# Patient Record
Sex: MALE | Race: OTHER | HISPANIC OR LATINO | URBAN - METROPOLITAN AREA
[De-identification: names, ages, dates, MRNs, and addresses within clinical notes are randomized per-mention and may not be internally consistent; named-entity substitution may affect disease eponyms.]

---

## 2020-06-16 ENCOUNTER — OFFICE VISIT (OUTPATIENT)
Dept: URBAN - METROPOLITAN AREA TELEHEALTH 2 | Facility: TELEHEALTH | Age: 45
End: 2020-06-16
Payer: SELF-PAY

## 2020-06-16 DIAGNOSIS — R42 DIZZINESS: ICD-10-CM

## 2020-06-16 DIAGNOSIS — R12 HEARTBURN: ICD-10-CM

## 2020-06-16 DIAGNOSIS — R10.32 LLQ PAIN: ICD-10-CM

## 2020-06-16 PROCEDURE — G9903 PT SCRN TBCO ID AS NON USER: HCPCS | Performed by: INTERNAL MEDICINE

## 2020-06-16 PROCEDURE — G8420 CALC BMI NORM PARAMETERS: HCPCS | Performed by: INTERNAL MEDICINE

## 2020-06-16 PROCEDURE — 99214 OFFICE O/P EST MOD 30 MIN: CPT | Performed by: INTERNAL MEDICINE

## 2020-06-16 PROCEDURE — G8427 DOCREV CUR MEDS BY ELIG CLIN: HCPCS | Performed by: INTERNAL MEDICINE

## 2020-06-16 PROCEDURE — 1036F TOBACCO NON-USER: CPT | Performed by: INTERNAL MEDICINE

## 2020-06-16 RX ORDER — HYOSCYAMINE SULFATE 0.12 MG/1
1 TABLET AS NEEDED TABLET ORAL
Qty: 40 | Refills: 2 | OUTPATIENT
Start: 2020-06-16

## 2020-06-16 RX ORDER — PANTOPRAZOLE SODIUM 40 MG/1
1 TABLET TABLET, DELAYED RELEASE ORAL
Qty: 30 | Refills: 2 | OUTPATIENT

## 2020-06-16 RX ORDER — DILTIAZEM HYDROCHLORIDE 120 MG/1
TAKE 1 CAPSULE (120 MG) BY ORAL ROUTE ONCE DAILY CAPSULE, EXTENDED RELEASE ORAL 1
Qty: 0 | Refills: 0 | Status: ACTIVE | COMMUNITY
Start: 1900-01-01

## 2020-06-16 RX ORDER — SUCRALFATE 1 G/1
TAKE 1 TABLET (1 GRAM) BY ORAL ROUTE 2 TIMES PER DAY ON AN EMPTY STOMACH 1 HOUR BEFORE LUNCH AND AT BEDTIME FOR 1 MONTH TABLET ORAL
Qty: 82 | Refills: 1 | Status: ON HOLD | COMMUNITY
Start: 2019-10-10

## 2020-06-16 NOTE — HPI-OTHER HISTORIES
He last saw me in clinic on November '19 with complaints of feeling that he has been needing to eat often. He has not noted the dizzy feeling if he doesn't eat every 2-3 hours. He had been taking Pantoprazole once daily with good results for his heartburn, and is asking whether I could prescribe this again for him as he has noticed increased heartburn lately.  I had him try Carafate but he said he took it for 4 days and it worsened his abdominal pain.  He tried using the IB Guard 30 minutes before meals. He does not feel that it made any difference.    He had been feeling quite well using home remedies, however a few days ago he started noticing some pain in the LLQ, 6/10 in intensity, stabbing in nature. He denies any associated diarrhea, constipation or blood in the stools. No nausea or vomiting. His weight has remained stable; as a matter he has gone up a few lbs. His appetite has been good.   He has no insurance. He was never able to get the CT A/P I ordered last time due to the cost.    Summary of prior workup: - Outside labs dated 7/12/19 revealed a HbA1c of 5.4%, WBC 6.0, hemoglobin 16.6, MCV 86, platelets 269. Glucose was hurt slightly elevated at 1:22, B1 15, creatinine 0.9, sodium 142, potassium 3.7. TP 7.6, albuminin 5.0, TB 0.6, , AST 23, ALT 23. Triglycerides 166. H pylori breath test negative. - EGD by Dr. Rivera on 3/22/18 showed a normal esoph, small HH, mild antral edema and a normal duodenum. Biopsies were neg for H pylori but consistent with reflux changes.

## 2020-07-16 ENCOUNTER — OFFICE VISIT (OUTPATIENT)
Dept: URBAN - METROPOLITAN AREA CLINIC 78 | Facility: CLINIC | Age: 45
End: 2020-07-16

## 2020-07-16 RX ORDER — HYOSCYAMINE SULFATE 0.12 MG/1
1 TABLET AS NEEDED TABLET ORAL
Qty: 40 | Refills: 2 | OUTPATIENT

## 2020-07-16 RX ORDER — PANTOPRAZOLE SODIUM 40 MG/1
1 TABLET TABLET, DELAYED RELEASE ORAL
Qty: 30 | Refills: 2 | OUTPATIENT

## 2020-07-16 NOTE — HPI-OTHER HISTORIES
He last saw me in clinic on November '19 with complaints of feeling that he has been needing to eat often. He has not noted the dizzy feeling if he doesn't eat every 2-3 hours. He had been taking Pantoprazole once daily with good results for his heartburn, and is asking whether I could prescribe this again for him as he has noticed increased heartburn lately.  I had him try Carafate but he said he took it for 4 days and it worsened his abdominal pain.  He tried using the IB Guard 30 minutes before meals. He does not feel that it made any difference.    He had been feeling quite well using home remedies, however a few days ago he started noticing some pain in the LLQ, 6/10 in intensity, stabbing in nature. He denies any associated diarrhea, constipation or blood in the stools. No nausea or vomiting. His weight has remained stable; as a matter he has gone up a few lbs. His appetite has been good.   He has no insurance. He was never able to get the CT A/P I ordered last time due to the cost.    Summary of prior workup: - Outside labs on 5/11/20 revealed a WBC of 7.3, hemoglobin 16.9, MCV 91, platelets 235,000. Glucose 108, BUN 21, creatinine 1.14, sodium 139, potassium 4.2, calcium 9.2, TP 7.1, albumin 4.5, TB 0.2, , AST 40, AL T 43. Total cholesterol 143, triglycerides 314. TSH 1.68 - Outside labs dated 7/12/19 revealed a HbA1c of 5.4%, WBC 6.0, hemoglobin 16.6, MCV 86, platelets 269. Glucose was hurt slightly elevated at 1:22, B1 15, creatinine 0.9, sodium 142, potassium 3.7. TP 7.6, albuminin 5.0, TB 0.6, , AST 23, ALT 23. Triglycerides 166. H pylori breath test negative. - EGD by Dr. Rivera on 3/22/18 showed a normal esoph, small HH, mild antral edema and a normal duodenum. Biopsies were neg for H pylori but consistent with reflux changes.

## 2020-07-23 ENCOUNTER — OFFICE VISIT (OUTPATIENT)
Dept: URBAN - METROPOLITAN AREA CLINIC 78 | Facility: CLINIC | Age: 45
End: 2020-07-23
Payer: SELF-PAY

## 2020-07-23 DIAGNOSIS — R12 HEARTBURN: ICD-10-CM

## 2020-07-23 DIAGNOSIS — R10.13 EPIGASTRIC PAIN: ICD-10-CM

## 2020-07-23 DIAGNOSIS — R42 DIZZINESS: ICD-10-CM

## 2020-07-23 DIAGNOSIS — R10.32 LLQ PAIN: ICD-10-CM

## 2020-07-23 PROCEDURE — 99214 OFFICE O/P EST MOD 30 MIN: CPT | Performed by: INTERNAL MEDICINE

## 2020-07-23 RX ORDER — HYOSCYAMINE SULFATE 0.12 MG/1
1 TABLET AS NEEDED TABLET ORAL
Qty: 40 | Refills: 2 | Status: ACTIVE | COMMUNITY

## 2020-07-23 RX ORDER — SUCRALFATE 1 G/1
TAKE 1 TABLET (1 GRAM) BY ORAL ROUTE 2 TIMES PER DAY ON AN EMPTY STOMACH 1 HOUR BEFORE LUNCH AND AT BEDTIME FOR 1 MONTH TABLET ORAL
Qty: 82 | Refills: 1 | Status: ON HOLD | COMMUNITY
Start: 2019-10-10

## 2020-07-23 RX ORDER — DILTIAZEM HYDROCHLORIDE 120 MG/1
TAKE 1 CAPSULE (120 MG) BY ORAL ROUTE ONCE DAILY CAPSULE, EXTENDED RELEASE ORAL 1
Qty: 0 | Refills: 0 | Status: ACTIVE | COMMUNITY
Start: 1900-01-01

## 2020-07-23 RX ORDER — PANTOPRAZOLE SODIUM 40 MG/1
1 TABLET TABLET, DELAYED RELEASE ORAL
Qty: 30 | Refills: 2 | Status: ACTIVE | COMMUNITY

## 2020-07-23 NOTE — PHYSICAL EXAM GASTROINTESTINAL
Abdomen , soft, mildly tender to palpation in the LLQ, nondistended , no guarding or rigidity , a small superficial 4cm mass noted on the L flank area- non-tender, normal bowel sounds , Liver and Spleen , no hepatomegaly present , no hepatosplenomegaly , liver nontender , spleen not palpable

## 2020-07-23 NOTE — HPI-OTHER HISTORIES
He initially saw me in clinic in 2019 with complaints of feeling that he has been needing to eat often. He was on Pantoprazole with good results of heartburn symptoms.  I had him try Carafate as well but he said he took it for 4 days and it worsened his abdominal pain.  He tried using the IB Guard 30 minutes before meals. He does not feel that it made any difference.    He feels slightly better. The pain is now 4/10 in intensity. He still continues complaining of frequent hunger pangs and will eat every 2-3 hours. He is also waking up in the middle of the night to eat. No nausea or vomiting. His weight has remained stable. His appetite has been good. He has continued using Pantoprazole QD. He has noted a metallic taste in his mouth. He denies any associated diarrhea, constipation or blood in the stools. He has used the Levsin prn for the LLQ pain with some relief. This pain is still present and remains unchanged.   He has no insurance. He was never able to get the CT A/P I ordered on the last 2 occasions due to the cost.    Summary of prior workup: - Outside labs on 5/11/20 revealed a WBC of 7.3, hemoglobin 16.9, MCV 91, platelets 235,000. Glucose 108, BUN 21, creatinine 1.14, sodium 139, potassium 4.2, calcium 9.2, TP 7.1, albumin 4.5, TB 0.2, , AST 40, AL T 43. Total cholesterol 143, triglycerides 314. TSH 1.68 - Outside labs dated 7/12/19 revealed a HbA1c of 5.4%, WBC 6.0, hemoglobin 16.6, MCV 86, platelets 269. Glucose was hurt slightly elevated at 1:22, B1 15, creatinine 0.9, sodium 142, potassium 3.7. TP 7.6, albuminin 5.0, TB 0.6, , AST 23, ALT 23. Triglycerides 166. H pylori breath test negative. - EGD by Dr. Rivera on 3/22/18 showed a normal esoph, small HH, mild antral edema and a normal duodenum. Biopsies were neg for H pylori but consistent with reflux changes.

## 2020-09-09 ENCOUNTER — OFFICE VISIT (OUTPATIENT)
Dept: URBAN - METROPOLITAN AREA SURGERY CENTER 15 | Facility: SURGERY CENTER | Age: 45
End: 2020-09-09

## 2020-12-31 ENCOUNTER — ERX REFILL RESPONSE (OUTPATIENT)
Dept: URBAN - METROPOLITAN AREA TELEHEALTH 2 | Facility: TELEHEALTH | Age: 45
End: 2020-12-31

## 2020-12-31 RX ORDER — PANTOPRAZOLE SODIUM 40 MG/1
1 TABLET TABLET, DELAYED RELEASE ORAL
Qty: 30 | Refills: 1

## 2021-01-21 ENCOUNTER — OFFICE VISIT (OUTPATIENT)
Dept: URBAN - METROPOLITAN AREA CLINIC 78 | Facility: CLINIC | Age: 46
End: 2021-01-21
Payer: SELF-PAY

## 2021-01-21 VITALS
HEART RATE: 71 BPM | WEIGHT: 149 LBS | DIASTOLIC BLOOD PRESSURE: 88 MMHG | TEMPERATURE: 98.6 F | HEIGHT: 63 IN | SYSTOLIC BLOOD PRESSURE: 153 MMHG | BODY MASS INDEX: 26.4 KG/M2

## 2021-01-21 DIAGNOSIS — R10.32 LLQ PAIN: ICD-10-CM

## 2021-01-21 DIAGNOSIS — R07.0 THROAT PAIN: ICD-10-CM

## 2021-01-21 DIAGNOSIS — R12 HEARTBURN: ICD-10-CM

## 2021-01-21 DIAGNOSIS — R10.13 EPIGASTRIC PAIN: ICD-10-CM

## 2021-01-21 PROCEDURE — G8420 CALC BMI NORM PARAMETERS: HCPCS | Performed by: INTERNAL MEDICINE

## 2021-01-21 PROCEDURE — 99214 OFFICE O/P EST MOD 30 MIN: CPT | Performed by: INTERNAL MEDICINE

## 2021-01-21 PROCEDURE — 1036F TOBACCO NON-USER: CPT | Performed by: INTERNAL MEDICINE

## 2021-01-21 PROCEDURE — G8427 DOCREV CUR MEDS BY ELIG CLIN: HCPCS | Performed by: INTERNAL MEDICINE

## 2021-01-21 PROCEDURE — G8482 FLU IMMUNIZE ORDER/ADMIN: HCPCS | Performed by: INTERNAL MEDICINE

## 2021-01-21 PROCEDURE — G9903 PT SCRN TBCO ID AS NON USER: HCPCS | Performed by: INTERNAL MEDICINE

## 2021-01-21 RX ORDER — DILTIAZEM HYDROCHLORIDE 120 MG/1
TAKE 1 CAPSULE (120 MG) BY ORAL ROUTE ONCE DAILY CAPSULE, EXTENDED RELEASE ORAL 1
Qty: 0 | Refills: 0 | Status: ACTIVE | COMMUNITY
Start: 1900-01-01

## 2021-01-21 RX ORDER — HYOSCYAMINE SULFATE 0.12 MG/1
1 TABLET AS NEEDED TABLET ORAL
Qty: 40 | Refills: 2 | Status: ACTIVE | COMMUNITY

## 2021-01-21 RX ORDER — PANTOPRAZOLE SODIUM 40 MG/1
1 TABLET TABLET, DELAYED RELEASE ORAL
Qty: 30 | Refills: 1 | Status: ACTIVE | COMMUNITY

## 2021-01-21 RX ORDER — PANTOPRAZOLE SODIUM 40 MG/1
1 TABLET TABLET, DELAYED RELEASE ORAL
Qty: 30 | Refills: 2

## 2021-01-21 RX ORDER — SUCRALFATE 1 G/1
TAKE 1 TABLET (1 GRAM) BY ORAL ROUTE 2 TIMES PER DAY ON AN EMPTY STOMACH 1 HOUR BEFORE LUNCH AND AT BEDTIME FOR 1 MONTH TABLET ORAL
Qty: 82 | Refills: 1 | Status: ON HOLD | COMMUNITY
Start: 2019-10-10

## 2021-01-21 NOTE — HPI-OTHER HISTORIES
He initially saw me in clinic in 2019 with complaints of feeling that he has been needing to eat often. He was on Pantoprazole with good results of heartburn symptoms.  I had him try Carafate as well but he said he took it for 4 days and it worsened his abdominal pain.  He tried using the IB Guard 30 minutes before meals. He does not feel that it made any difference.   He is noticing GERD again with some burning in both the throat and tongue. He denies hoarseness. He is using Aloe and drinking potato juice with some relief. He is not on any PPI currently.    He still continues complaining of frequent hunger pangs and will eat every 2-3 hours. He is also waking up in the middle of the night to eat. No nausea or vomiting. His weight has remained stable. His appetite has been good. He denies any associated diarrhea, constipation or blood in the stools. He used the Levsin prn for the LLQ pain with some relief, however has not had to use it recently.   He has no insurance. He was never able to get the CT A/P I ordered on the last 2 occasions due to the cost. The same thing occurred with the E/C I ordered last year.  Summary of prior workup: - Outside labs on 5/11/20 revealed a WBC of 7.3, hemoglobin 16.9, MCV 91, platelets 235,000. Glucose 108, BUN 21, creatinine 1.14, sodium 139, potassium 4.2, calcium 9.2, TP 7.1, albumin 4.5, TB 0.2, , AST 40, AL T 43. Total cholesterol 143, triglycerides 314. TSH 1.68 - Outside labs dated 7/12/19 revealed a HbA1c of 5.4%, WBC 6.0, hemoglobin 16.6, MCV 86, platelets 269. Glucose was hurt slightly elevated at 1:22, B1 15, creatinine 0.9, sodium 142, potassium 3.7. TP 7.6, albuminin 5.0, TB 0.6, , AST 23, ALT 23. Triglycerides 166. H pylori breath test negative. - EGD by Dr. Rivera on 3/22/18 showed a normal esoph, small HH, mild antral edema and a normal duodenum. Biopsies were neg for H pylori but consistent with reflux changes.

## 2021-03-08 ENCOUNTER — TELEPHONE ENCOUNTER (OUTPATIENT)
Dept: URBAN - METROPOLITAN AREA CLINIC 78 | Facility: CLINIC | Age: 46
End: 2021-03-08

## 2021-03-17 ENCOUNTER — OFFICE VISIT (OUTPATIENT)
Dept: URBAN - METROPOLITAN AREA SURGERY CENTER 15 | Facility: SURGERY CENTER | Age: 46
End: 2021-03-17

## 2021-05-04 ENCOUNTER — TELEPHONE ENCOUNTER (OUTPATIENT)
Dept: URBAN - METROPOLITAN AREA CLINIC 78 | Facility: CLINIC | Age: 46
End: 2021-05-04

## 2021-06-02 ENCOUNTER — OFFICE VISIT (OUTPATIENT)
Dept: URBAN - METROPOLITAN AREA SURGERY CENTER 15 | Facility: SURGERY CENTER | Age: 46
End: 2021-06-02
Payer: SELF-PAY

## 2021-06-02 ENCOUNTER — CLAIMS CREATED FROM THE CLAIM WINDOW (OUTPATIENT)
Dept: URBAN - METROPOLITAN AREA CLINIC 4 | Facility: CLINIC | Age: 46
End: 2021-06-02
Payer: SELF-PAY

## 2021-06-02 DIAGNOSIS — K21.9 ACID REFLUX: ICD-10-CM

## 2021-06-02 DIAGNOSIS — K21.9 GASTRO-ESOPHAGEAL REFLUX DISEASE WITHOUT ESOPHAGITIS: ICD-10-CM

## 2021-06-02 DIAGNOSIS — K31.89 SMALL STOMACH SYNDROME: ICD-10-CM

## 2021-06-02 PROCEDURE — 88305 TISSUE EXAM BY PATHOLOGIST: CPT | Performed by: PATHOLOGY

## 2021-06-02 PROCEDURE — G8907 PT DOC NO EVENTS ON DISCHARG: HCPCS | Performed by: INTERNAL MEDICINE

## 2021-06-02 PROCEDURE — 43239 EGD BIOPSY SINGLE/MULTIPLE: CPT | Performed by: INTERNAL MEDICINE

## 2021-06-02 PROCEDURE — 88312 SPECIAL STAINS GROUP 1: CPT | Performed by: PATHOLOGY

## 2021-06-02 RX ORDER — HYOSCYAMINE SULFATE 0.12 MG/1
1 TABLET AS NEEDED TABLET ORAL
Qty: 40 | Refills: 2 | Status: ACTIVE | COMMUNITY

## 2021-06-02 RX ORDER — DILTIAZEM HYDROCHLORIDE 120 MG/1
TAKE 1 CAPSULE (120 MG) BY ORAL ROUTE ONCE DAILY CAPSULE, EXTENDED RELEASE ORAL 1
Qty: 0 | Refills: 0 | Status: ACTIVE | COMMUNITY
Start: 1900-01-01

## 2021-06-02 RX ORDER — PANTOPRAZOLE SODIUM 40 MG/1
1 TABLET TABLET, DELAYED RELEASE ORAL
Qty: 30 | Refills: 2 | Status: ACTIVE | COMMUNITY

## 2021-06-02 RX ORDER — SUCRALFATE 1 G/1
TAKE 1 TABLET (1 GRAM) BY ORAL ROUTE 2 TIMES PER DAY ON AN EMPTY STOMACH 1 HOUR BEFORE LUNCH AND AT BEDTIME FOR 1 MONTH TABLET ORAL
Qty: 82 | Refills: 1 | Status: ON HOLD | COMMUNITY
Start: 2019-10-10

## 2023-01-03 ENCOUNTER — OFFICE VISIT (OUTPATIENT)
Dept: URBAN - METROPOLITAN AREA CLINIC 78 | Facility: CLINIC | Age: 48
End: 2023-01-03
Payer: SELF-PAY

## 2023-01-03 VITALS
HEIGHT: 63 IN | HEART RATE: 97 BPM | BODY MASS INDEX: 28.07 KG/M2 | WEIGHT: 158.4 LBS | TEMPERATURE: 98.1 F | DIASTOLIC BLOOD PRESSURE: 97 MMHG | RESPIRATION RATE: 16 BRPM | SYSTOLIC BLOOD PRESSURE: 156 MMHG

## 2023-01-03 DIAGNOSIS — R10.32 LLQ PAIN: ICD-10-CM

## 2023-01-03 DIAGNOSIS — R12 HEARTBURN: ICD-10-CM

## 2023-01-03 DIAGNOSIS — R07.0 THROAT PAIN: ICD-10-CM

## 2023-01-03 DIAGNOSIS — R10.13 EPIGASTRIC PAIN: ICD-10-CM

## 2023-01-03 DIAGNOSIS — K62.5 RECTAL BLEEDING: ICD-10-CM

## 2023-01-03 PROCEDURE — 99214 OFFICE O/P EST MOD 30 MIN: CPT | Performed by: INTERNAL MEDICINE

## 2023-01-03 RX ORDER — HYOSCYAMINE SULFATE 0.12 MG/1
1 TABLET AS NEEDED TABLET ORAL
Qty: 40 | Refills: 2
End: 2023-09-30

## 2023-01-03 RX ORDER — HYOSCYAMINE SULFATE 0.12 MG/1
1 TABLET AS NEEDED TABLET ORAL
Qty: 40 | Refills: 2 | Status: ACTIVE | COMMUNITY

## 2023-01-03 RX ORDER — PANTOPRAZOLE SODIUM 40 MG/1
1 TABLET TABLET, DELAYED RELEASE ORAL
Qty: 30 | Refills: 2

## 2023-01-03 RX ORDER — SUCRALFATE 1 G/1
TAKE 1 TABLET (1 GRAM) BY ORAL ROUTE 2 TIMES PER DAY ON AN EMPTY STOMACH 1 HOUR BEFORE LUNCH AND AT BEDTIME FOR 1 MONTH TABLET ORAL
Qty: 82 | Refills: 1 | Status: ON HOLD | COMMUNITY
Start: 2019-10-10

## 2023-01-03 RX ORDER — PANTOPRAZOLE SODIUM 40 MG/1
1 TABLET TABLET, DELAYED RELEASE ORAL
Qty: 30 | Refills: 2 | Status: ACTIVE | COMMUNITY

## 2023-01-03 RX ORDER — DILTIAZEM HYDROCHLORIDE 120 MG/1
TAKE 1 CAPSULE (120 MG) BY ORAL ROUTE ONCE DAILY CAPSULE, EXTENDED RELEASE ORAL 1
Qty: 0 | Refills: 0 | Status: ACTIVE | COMMUNITY
Start: 1900-01-01

## 2023-01-03 NOTE — HPI-OTHER HISTORIES
He initially saw me in clinic in 2019 with complaints of feeling that he has been needing to eat often. He was on Pantoprazole with good results of heartburn symptoms.  I had him try Carafate as well but he said he took it for 4 days and it worsened his abdominal pain.  He tried using the IB Guard 30 minutes before meals. He does not feel that it made any difference.   He is noticing GERD again with some burning in both the throat and tongue. He denies hoarseness. Today we reviewed the results of his recent EGD and path.  He is not on any PPI currently.   He still continues complaining of frequent hunger pangs and will eat every 2-3 hours. He is also waking up in the middle of the night to eat. No nausea or vomiting. His weight has remained stable. His appetite has been good. He denies any associated diarrhea, constipation.  He has recenlty noted blood in the stools. He used the Levsin prn for the LLQ pain with some relief, however has not had to use it recently.   He has no insurance. He was never able to get the CT A/P I ordered on the last 2 occasions due to the cost.   Summary of prior workup: - EGD by me on 6/2/2021: Ectopic gastric mucosa in the upper third of the esophagus, normal mucosa throughout the rest of the esophagus (upper and lower esophageal biopsies consistent with acid reflux). 2 cm hiatal hernia.  Normal body and antrum.  Gastric cardia and fundus did not show any gross lesions.  Widely patent pyloric channel.  Normal duodenum.  Biopsies were negative for H. pylori or celiac sprue. - Outside labs on 5/11/20 revealed a WBC of 7.3, hemoglobin 16.9, MCV 91, platelets 235,000. Glucose 108, BUN 21, creatinine 1.14, sodium 139, potassium 4.2, calcium 9.2, TP 7.1, albumin 4.5, TB 0.2, , AST 40, AL T 43. Triglycerides 314. TSH 1.68 - Outside labs dated 7/12/19 revealed a HbA1c of 5.4%, WBC 6.0, hemoglobin 16.6, MCV 86, platelets 269. Glucose was hurt slightly elevated at 1:22, B1 15, creatinine 0.9, sodium 142, potassium 3.7. TP 7.6, albuminin 5.0, TB 0.6, , AST 23, ALT 23. Triglycerides 166. H pylori breath test negative. - EGD by Dr. Rivera on 3/22/18 showed a normal esoph, small HH, mild antral edema and a normal duodenum. Biopsies were neg for H pylori but consistent with reflux changes.

## 2023-01-17 ENCOUNTER — TELEPHONE ENCOUNTER (OUTPATIENT)
Dept: URBAN - METROPOLITAN AREA CLINIC 78 | Facility: CLINIC | Age: 48
End: 2023-01-17

## 2023-01-20 ENCOUNTER — OFFICE VISIT (OUTPATIENT)
Dept: URBAN - METROPOLITAN AREA SURGERY CENTER 15 | Facility: SURGERY CENTER | Age: 48
End: 2023-01-20

## 2023-03-21 ENCOUNTER — DASHBOARD ENCOUNTERS (OUTPATIENT)
Age: 48
End: 2023-03-21

## 2023-03-21 ENCOUNTER — OFFICE VISIT (OUTPATIENT)
Dept: URBAN - METROPOLITAN AREA CLINIC 78 | Facility: CLINIC | Age: 48
End: 2023-03-21
Payer: SELF-PAY

## 2023-03-21 VITALS
WEIGHT: 156 LBS | RESPIRATION RATE: 17 BRPM | HEART RATE: 94 BPM | SYSTOLIC BLOOD PRESSURE: 163 MMHG | HEIGHT: 63 IN | TEMPERATURE: 98.3 F | DIASTOLIC BLOOD PRESSURE: 96 MMHG | BODY MASS INDEX: 27.64 KG/M2

## 2023-03-21 DIAGNOSIS — R07.0 THROAT PAIN: ICD-10-CM

## 2023-03-21 DIAGNOSIS — K62.5 RECTAL BLEEDING: ICD-10-CM

## 2023-03-21 DIAGNOSIS — R10.13 EPIGASTRIC PAIN: ICD-10-CM

## 2023-03-21 DIAGNOSIS — R10.32 LLQ PAIN: ICD-10-CM

## 2023-03-21 DIAGNOSIS — R12 HEARTBURN: ICD-10-CM

## 2023-03-21 PROCEDURE — 99214 OFFICE O/P EST MOD 30 MIN: CPT | Performed by: INTERNAL MEDICINE

## 2023-03-21 RX ORDER — PANTOPRAZOLE SODIUM 40 MG/1
1 TABLET TABLET, DELAYED RELEASE ORAL
Qty: 30 | Refills: 2 | Status: ACTIVE | COMMUNITY

## 2023-03-21 RX ORDER — DILTIAZEM HYDROCHLORIDE 120 MG/1
TAKE 1 CAPSULE (120 MG) BY ORAL ROUTE ONCE DAILY CAPSULE, EXTENDED RELEASE ORAL 1
Qty: 0 | Refills: 0 | Status: ACTIVE | COMMUNITY
Start: 1900-01-01

## 2023-03-21 RX ORDER — HYOSCYAMINE SULFATE 0.12 MG/1
1 TABLET AS NEEDED TABLET ORAL
Qty: 40 | Refills: 2 | Status: ACTIVE | COMMUNITY
End: 2023-09-30

## 2023-03-21 RX ORDER — PANTOPRAZOLE SODIUM 40 MG/1
1 TABLET TABLET, DELAYED RELEASE ORAL
Qty: 30 | Refills: 2

## 2023-03-21 RX ORDER — SUCRALFATE 1 G/1
TAKE 1 TABLET (1 GRAM) BY ORAL ROUTE 2 TIMES PER DAY ON AN EMPTY STOMACH 1 HOUR BEFORE LUNCH AND AT BEDTIME FOR 1 MONTH TABLET ORAL
Qty: 82 | Refills: 1 | Status: ON HOLD | COMMUNITY
Start: 2019-10-10

## 2023-03-21 NOTE — HPI-OTHER HISTORIES
He initially saw me in clinic in 2019 with complaints of feeling that he has been needing to eat often. He was on Pantoprazole with good results of heartburn symptoms.  I had him try Carafate as well but he said he took it for 4 days and it worsened his abdominal pain.  He tried using the IB Guard 30 minutes before meals. He does not feel that it made any difference.   He is noticing GERD again with some burning in both the throat and tongue. He denies hoarseness. He is still using Pantoprazole with some relief.   He still continues complaining of frequent hunger pangs and will eat every 2-3 hours. He is also waking up in the middle of the night to eat. No nausea or vomiting. His weight has remained stable. His appetite has been good. He denies any associated diarrhea, constipation.  He tells me today he has been noticing rectal bleeding on and off. He used the Levsin prn for the LLQ pain with some relief, however has not had to use it recently.   He has no insurance. He was never able to get the CT A/P I ordered on the last few occasions due to the cost.   Summary of prior workup: - EGD by me on 6/2/2021: Ectopic gastric mucosa in the upper third of the esophagus, normal mucosa throughout the rest of the esophagus (upper and lower esophageal biopsies consistent with acid reflux). 2 cm hiatal hernia.  Normal body and antrum.  Gastric cardia and fundus did not show any gross lesions.  Widely patent pyloric channel.  Normal duodenum.  Biopsies were negative for H. pylori or celiac sprue. - Outside labs on 5/11/20 revealed a WBC of 7.3, hemoglobin 16.9, MCV 91, platelets 235,000. Glucose 108, BUN 21, creatinine 1.14, sodium 139, potassium 4.2, calcium 9.2, TP 7.1, albumin 4.5, TB 0.2, , AST 40, AL T 43. Triglycerides 314. TSH 1.68 - Outside labs dated 7/12/19 revealed a HbA1c of 5.4%, WBC 6.0, hemoglobin 16.6, MCV 86, platelets 269. Glucose was hurt slightly elevated at 1:22, B1 15, creatinine 0.9, sodium 142, potassium 3.7. TP 7.6, albuminin 5.0, TB 0.6, , AST 23, ALT 23. Triglycerides 166. H pylori breath test negative. - EGD by Dr. Rivera on 3/22/18 showed a normal esoph, small HH, mild antral edema and a normal duodenum. Biopsies were neg for H pylori but consistent with reflux changes.

## 2023-04-26 ENCOUNTER — OFFICE VISIT (OUTPATIENT)
Dept: URBAN - METROPOLITAN AREA SURGERY CENTER 15 | Facility: SURGERY CENTER | Age: 48
End: 2023-04-26

## 2023-07-12 ENCOUNTER — OFFICE VISIT (OUTPATIENT)
Dept: URBAN - METROPOLITAN AREA SURGERY CENTER 15 | Facility: SURGERY CENTER | Age: 48
End: 2023-07-12